# Patient Record
Sex: MALE | URBAN - METROPOLITAN AREA
[De-identification: names, ages, dates, MRNs, and addresses within clinical notes are randomized per-mention and may not be internally consistent; named-entity substitution may affect disease eponyms.]

---
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
## 2023-08-13 NOTE — PROCEDURE: ISOTRETINOIN MONITORING
Patient called writer and has c/o of chest pain that felt tight radiating to left shoulder and back, and difficulty breathing. Also voiced concern about bleeding hemorrhoids. MD notified.   STAT Troponin , EKG, CXR, proBNP, 40mg lasix and tele ordered.    Target Cumulative Dosage (In Mg/Kg): 135